# Patient Record
Sex: MALE | Race: BLACK OR AFRICAN AMERICAN | URBAN - METROPOLITAN AREA
[De-identification: names, ages, dates, MRNs, and addresses within clinical notes are randomized per-mention and may not be internally consistent; named-entity substitution may affect disease eponyms.]

---

## 2017-03-31 ENCOUNTER — HOSPITAL ENCOUNTER (EMERGENCY)
Facility: CLINIC | Age: 22
Discharge: HOME | End: 2017-04-01
Attending: EMERGENCY MEDICINE | Admitting: EMERGENCY MEDICINE
Payer: COMMERCIAL

## 2017-03-31 VITALS
OXYGEN SATURATION: 99 % | SYSTOLIC BLOOD PRESSURE: 132 MMHG | DIASTOLIC BLOOD PRESSURE: 75 MMHG | RESPIRATION RATE: 16 BRPM | TEMPERATURE: 101.7 F

## 2017-03-31 DIAGNOSIS — J11.1 INFLUENZA-LIKE ILLNESS: ICD-10-CM

## 2017-03-31 DIAGNOSIS — R50.9 FEVER, UNSPECIFIED: ICD-10-CM

## 2017-03-31 PROCEDURE — 99283 EMERGENCY DEPT VISIT LOW MDM: CPT

## 2017-03-31 PROCEDURE — 25000132 ZZH RX MED GY IP 250 OP 250 PS 637: Performed by: EMERGENCY MEDICINE

## 2017-03-31 RX ORDER — IBUPROFEN 600 MG/1
600 TABLET, FILM COATED ORAL ONCE
Status: COMPLETED | OUTPATIENT
Start: 2017-03-31 | End: 2017-03-31

## 2017-03-31 RX ADMIN — IBUPROFEN 600 MG: 600 TABLET, FILM COATED ORAL at 22:55

## 2017-03-31 ASSESSMENT — ENCOUNTER SYMPTOMS
BACK PAIN: 1
SORE THROAT: 0
COUGH: 0
FEVER: 1

## 2017-03-31 NOTE — ED AVS SNAPSHOT
[facility] Emergency Department    [address]    [facility] [address]    Phone:  [phone]    Fax:  [phone]                                       Shanell Krause   MRN: [id number]    Department:  [facility] Emergency Department   Date of Visit:  3/31/2017           After Visit Summary Signature Page     I have received my discharge instructions, and my questions have been answered. I have discussed any challenges I see with this plan with the nurse or doctor.    ..........................................................................................................................................  Patient/Patient Representative Signature      ..........................................................................................................................................  Patient Representative Print Name and Relationship to Patient    ..................................................               ................................................  Date                                            Time    ..........................................................................................................................................  Reviewed by Signature/Title    ...................................................              ..............................................  Date                                                            Time

## 2017-03-31 NOTE — ED AVS SNAPSHOT
[facility] Emergency Department    [address] [place] [address]    Phone:  [phone]    Fax:  [phone]                                       Shanell Krause   MRN: [id number]    Department:  [facility] Emergency Department   Date of Visit:  3/31/2017           Patient Information     Date Of Birth          1995        Your diagnoses for this visit were:     Fever, unspecified     Influenza-like illness        You were seen by Emery Odell MD.      Follow-up Information     Follow up with PCP prn.        Discharge Instructions         Discharge Instructions  Fever    You have been seen today for a fever. Fever is a normal body reaction to illness or inflammation. Fever is a sign that your body is doing what it should to fight something off. Fever is not dangerous, but it can make you feel miserable, and you will probably feel better if you get your fever to go down. Most infections are caused by a virus, and antibiotics will not help. Your doctor will tell you whether antibiotics are needed in your case.     At this time your doctor does not find that your fever is a sign of anything dangerous or life-threatening.  However, sometimes the signs of serious illness do not show up right away.  If you have new or worse symptoms, you may need to be seen again in the Emergency Department or by your primary doctor.      What can I do to help myself?    Fill any prescriptions the doctor gave you and take them right away--especially antibiotics.    If you have a fever, get plenty of rest and drink lots of fluids, especially water.    What clothes or blankets you have on won t change your fever. Do what is comfortable for you.    Bathing or sponging in lukewarm water may help you feel better.    Tylenol  (acetaminophen), Motrin  (ibuprofen), or Advil  (ibuprofen) help bring fever down and may help you feel more comfortable. Be sure to read and follow the package  "directions, and ask your doctor if you have questions.    Do not drink alcohol.    Return to the Emergency Department if:    Any of the symptoms you have get much worse.    You seem very sick, like being too weak to get up.    You have any new symptoms, especially serious things like abdominal pain or chest pain.    You are short of breath.    You have a severe headache.    You are vomiting so much you can t keep fluids or medicines down.    You have confusion or seem unusually drowsy.    You have a seizure or convulsion.    You are not getting better after 3-5 days.    You have anything else that worries you.  Probiotics: If you have been given an antibiotic, you may want to also take a probiotic pill or eat yogurt with live cultures. Probiotics have \"good bacteria\" to help your intestines stay healthy. Studies have shown that probiotics help prevent diarrhea and other intestine problems (including C. diff infection) when you take antibiotics. You can buy these without a prescription in the pharmacy section of the store.   If your doctor has told you to follow-up at your clinic, be sure to call right away and go to your appointment.  If there is any problem with keeping your appointment, call your doctor or return to the Emergency Department.  If you were given a prescription for medicine here today, be sure to read all of the information (including the package insert) that comes with your prescription.  This will include important information about the medicine, its side effects, and any warnings that you need to know about.  The pharmacist who fills the prescription can provide more information and answer questions you may have about the medicine.  If you have questions or concerns that the pharmacist cannot address, please call or return to the Emergency Department.     Remember that you can always come back to the Emergency Department if you are not able to see your regular doctor in the amount of time listed " above, if you get any new symptoms, or if there is anything that worries you.      24 Hour Appointment Hotline       To make an appointment at any [facility], call [phone] ([phone]). If you don't have a family doctor or clinic, we will help you find one. [facility] clinics are conveniently located to serve the needs of you and your family.             Review of your medicines      Notice     You have not been prescribed any medications.            Orders Needing Specimen Collection     None      Pending Results     No orders found for last 3 day(s).            Pending Culture Results     No orders found for last 3 day(s).             Test Results from your hospital stay            Clinical Quality Measure: Blood Pressure Screening     Your blood pressure was checked while you were in the emergency department today. The last reading we obtained was  BP: 132/75 . Please read the guidelines below about what these numbers mean and what you should do about them.  If your systolic blood pressure (the top number) is less than 120 and your diastolic blood pressure (the bottom number) is less than 80, then your blood pressure is normal. There is nothing more that you need to do about it.  If your systolic blood pressure (the top number) is 120-139 or your diastolic blood pressure (the bottom number) is 80-89, your blood pressure may be higher than it should be. You should have your blood pressure rechecked within a year by a primary care provider.  If your systolic blood pressure (the top number) is 140 or greater or your diastolic blood pressure (the bottom number) is 90 or greater, you may have high blood pressure. High blood pressure is treatable, but if left untreated over time it can put you at risk for heart attack, stroke, or kidney failure. You should have your blood pressure rechecked by a primary care provider within the next 4 weeks.  If your provider in the emergency department today gave you  "specific instructions to follow-up with your doctor or provider even sooner than that, you should follow that instruction and not wait for up to 4 weeks for your follow-up visit.        Thank you for choosing [facility]       Thank you for choosing [facility] for your care. Our goal is always to provide you with excellent care. Hearing back from our patients is one way we can continue to improve our services. Please take a few minutes to complete the written survey that you may receive in the mail after you visit with us. Thank you!        [employer] Information     [employer] lets you send messages to your doctor, view your test results, renew your prescriptions, schedule appointments and more. To sign up, go to [email or web address] . Click on \"Log in\" on the left side of the screen, which will take you to the Welcome page. Then click on \"Sign up Now\" on the right side of the page.     You will be asked to enter the access code listed below, as well as some personal information. Please follow the directions to create your username and password.     Your access code is: [email or web address]  Expires: 2017 11:52 PM     Your access code will  in 90 days. If you need help or a new code, please call your [facility] clinic or [phone].        Care EveryWhere ID     This is your Care EveryWhere ID. This could be used by other organizations to access your [facility] medical records  [id number]        After Visit Summary       This is your record. Keep this with you and show to your community pharmacist(s) and doctor(s) at your next visit.                  "

## 2017-04-01 NOTE — DISCHARGE INSTRUCTIONS
Discharge Instructions  Fever    You have been seen today for a fever. Fever is a normal body reaction to illness or inflammation. Fever is a sign that your body is doing what it should to fight something off. Fever is not dangerous, but it can make you feel miserable, and you will probably feel better if you get your fever to go down. Most infections are caused by a virus, and antibiotics will not help. Your doctor will tell you whether antibiotics are needed in your case.     At this time your doctor does not find that your fever is a sign of anything dangerous or life-threatening.  However, sometimes the signs of serious illness do not show up right away.  If you have new or worse symptoms, you may need to be seen again in the Emergency Department or by your primary doctor.      What can I do to help myself?    Fill any prescriptions the doctor gave you and take them right away--especially antibiotics.    If you have a fever, get plenty of rest and drink lots of fluids, especially water.    What clothes or blankets you have on won t change your fever. Do what is comfortable for you.    Bathing or sponging in lukewarm water may help you feel better.    Tylenol  (acetaminophen), Motrin  (ibuprofen), or Advil  (ibuprofen) help bring fever down and may help you feel more comfortable. Be sure to read and follow the package directions, and ask your doctor if you have questions.    Do not drink alcohol.    Return to the Emergency Department if:    Any of the symptoms you have get much worse.    You seem very sick, like being too weak to get up.    You have any new symptoms, especially serious things like abdominal pain or chest pain.    You are short of breath.    You have a severe headache.    You are vomiting so much you can t keep fluids or medicines down.    You have confusion or seem unusually drowsy.    You have a seizure or convulsion.    You are not getting better after 3-5 days.    You have anything else that  "worries you.  Probiotics: If you have been given an antibiotic, you may want to also take a probiotic pill or eat yogurt with live cultures. Probiotics have \"good bacteria\" to help your intestines stay healthy. Studies have shown that probiotics help prevent diarrhea and other intestine problems (including C. diff infection) when you take antibiotics. You can buy these without a prescription in the pharmacy section of the store.   If your doctor has told you to follow-up at your clinic, be sure to call right away and go to your appointment.  If there is any problem with keeping your appointment, call your doctor or return to the Emergency Department.  If you were given a prescription for medicine here today, be sure to read all of the information (including the package insert) that comes with your prescription.  This will include important information about the medicine, its side effects, and any warnings that you need to know about.  The pharmacist who fills the prescription can provide more information and answer questions you may have about the medicine.  If you have questions or concerns that the pharmacist cannot address, please call or return to the Emergency Department.     Remember that you can always come back to the Emergency Department if you are not able to see your regular doctor in the amount of time listed above, if you get any new symptoms, or if there is anything that worries you.    "

## 2017-04-01 NOTE — ED PROVIDER NOTES
History     Chief Complaint:  Fever   Back Pain    HPI   Shanell Krause is a 21 year old male who presents to the emergency department today for evaluation of fever and left upper back pain that began 2 hours ago. Patient also reports associated body aches. Patient states that he did not receive influenza vaccine this year. He denies recent travel, injury, or ill contacts. He also denies obvious rash, sore throat, vomiting, diarrhea, headaches, cough, or urinary symptoms. No other concerns were voiced at this time.     Allergies:  No Known Drug Allergies    Medications:    The patient is currently on no regular medications.      Past Medical History:    History reviewed. No pertinent past medical history.    Past Surgical History:    History reviewed. No pertinent past surgical history.    Family History:    History reviewed. No pertinent family history.     Social History:  The patient was accompanied to the ED by family.     Review of Systems   Constitutional: Positive for fever.   HENT: Negative for sore throat.    Respiratory: Negative for cough.    Musculoskeletal: Positive for back pain.   All other systems reviewed and are negative.    Physical Exam   First Vitals:  BP: 132/75  Heart Rate: 91  Temp: 101.7  F (38.7  C)  Resp: 16  SpO2: 99 %      Physical Exam  Nursing note and vitals reviewed.  Constitutional: Cooperative. Sitting up comfortably in bed.   HENT:   Mouth/Throat: Mucous membranes are normal.   TMs and posterior oropharynx are normal.   Neck: No neck rigidity.   Lymph:  Mild left cervical lymph adenopathy  Eyes: No discharge   Cardiovascular: Normal rate, regular rhythm and normal heart sounds.  No murmur.  Pulmonary/Chest: Effort normal and breath sounds normal. No respiratory distress. No wheezes. No rales.   Abdominal: Soft. Normal appearance and bowel sounds are normal. No distension. There is no tenderness. There is no rigidity and no guarding.   Neurological: Alert. Oriented x4.   Strength normal.   Skin: Skin is warm and dry. No rash noted.   Psychiatric: Normal mood and affect.     Emergency Department Course     Interventions:  2255 Ibuprofen 600 mg Oral     Emergency Department Course:  Nursing notes and vitals reviewed.  I performed an exam of the patient as documented above.  At 2346 the patient was evaluated and we discussed return precautions and plan of care.      I personally reviewed the treatment plan with the Patient and answered all related questions prior to discharge.    Impression & Plan      Medical Decision Making:  Shanell Krause is a 21 year old male who presents with 2 hours of fever. He also has some midthoracic back pain. No falls or trauma. No red flag symptoms. He has no neck rigidity, altered mentation, or concern clinically for meningitis or encephalitis. No obvious source of his fever. He did not receive the flu shot this year and I suspect influenza like illness as being his ultimate etiology. Without underlying respiratory illness, there is no reason to perform nasal swab and I would no prescribe Tamiflu if positive. He is no hypoxic and his lung exam is reassuring. No cutaneous findings or abdominal findings of concern. At this time, in an otherwise healthy 21 year old, I have recommended fever control with appropriate return precautionary.     Diagnosis:    ICD-10-CM    1. Fever, unspecified R50.9        Disposition:   Discharge home; plan for follow up with PCP.    Scribe Disclosure:  I, Leo Man, am serving as a scribe at 11:44 PM on 3/31/2017 to document services personally performed by Emery Odell MD, based on my observations and the provider's statements to me.  [facility] EMERGENCY DEPARTMENT       Emery Odell MD  04/01/17 0029

## (undated) RX ORDER — IBUPROFEN 600 MG/1
TABLET, FILM COATED ORAL
Status: DISPENSED
Start: 2017-03-31